# Patient Record
Sex: MALE | Race: AMERICAN INDIAN OR ALASKA NATIVE | HISPANIC OR LATINO | Employment: OTHER | URBAN - METROPOLITAN AREA
[De-identification: names, ages, dates, MRNs, and addresses within clinical notes are randomized per-mention and may not be internally consistent; named-entity substitution may affect disease eponyms.]

---

## 2022-10-11 ENCOUNTER — HOSPITAL ENCOUNTER (EMERGENCY)
Facility: HOSPITAL | Age: 22
Discharge: HOME | End: 2022-10-11
Admitting: EMERGENCY MEDICINE

## 2022-10-11 VITALS
OXYGEN SATURATION: 99 % | TEMPERATURE: 100 F | RESPIRATION RATE: 16 BRPM | HEIGHT: 63 IN | HEART RATE: 100 BPM | SYSTOLIC BLOOD PRESSURE: 122 MMHG | DIASTOLIC BLOOD PRESSURE: 75 MMHG

## 2022-10-11 DIAGNOSIS — S71.112A LACERATION OF SKIN OF LEFT THIGH, INITIAL ENCOUNTER: ICD-10-CM

## 2022-10-11 PROCEDURE — 250N000009 HC RX 250: Performed by: EMERGENCY MEDICINE

## 2022-10-11 PROCEDURE — 250N000011 HC RX IP 250 OP 636: Performed by: EMERGENCY MEDICINE

## 2022-10-11 PROCEDURE — 99283 EMERGENCY DEPT VISIT LOW MDM: CPT | Mod: 25

## 2022-10-11 PROCEDURE — 250N000013 HC RX MED GY IP 250 OP 250 PS 637: Performed by: EMERGENCY MEDICINE

## 2022-10-11 PROCEDURE — 271N000002 HC RX 271: Performed by: EMERGENCY MEDICINE

## 2022-10-11 PROCEDURE — 90715 TDAP VACCINE 7 YRS/> IM: CPT | Performed by: EMERGENCY MEDICINE

## 2022-10-11 PROCEDURE — 90471 IMMUNIZATION ADMIN: CPT | Performed by: EMERGENCY MEDICINE

## 2022-10-11 PROCEDURE — 12002 RPR S/N/AX/GEN/TRNK2.6-7.5CM: CPT

## 2022-10-11 RX ORDER — GINSENG 100 MG
CAPSULE ORAL ONCE
Status: DISCONTINUED | OUTPATIENT
Start: 2022-10-11 | End: 2022-10-11 | Stop reason: HOSPADM

## 2022-10-11 RX ORDER — METHYLCELLULOSE 4000CPS 30 %
POWDER (GRAM) MISCELLANEOUS
Status: COMPLETED | OUTPATIENT
Start: 2022-10-11 | End: 2022-10-11

## 2022-10-11 RX ORDER — IBUPROFEN 600 MG/1
600 TABLET, FILM COATED ORAL ONCE
Status: COMPLETED | OUTPATIENT
Start: 2022-10-11 | End: 2022-10-11

## 2022-10-11 RX ADMIN — EPINEPHRINE BITARTRATE 3 ML: 1 POWDER at 19:14

## 2022-10-11 RX ADMIN — CLOSTRIDIUM TETANI TOXOID ANTIGEN (FORMALDEHYDE INACTIVATED), CORYNEBACTERIUM DIPHTHERIAE TOXOID ANTIGEN (FORMALDEHYDE INACTIVATED), BORDETELLA PERTUSSIS TOXOID ANTIGEN (GLUTARALDEHYDE INACTIVATED), BORDETELLA PERTUSSIS FILAMENTOUS HEMAGGLUTININ ANTIGEN (FORMALDEHYDE INACTIVATED), BORDETELLA PERTUSSIS PERTACTIN ANTIGEN, AND BORDETELLA PERTUSSIS FIMBRIAE 2/3 ANTIGEN 0.5 ML: 5; 2; 2.5; 5; 3; 5 INJECTION, SUSPENSION INTRAMUSCULAR at 19:15

## 2022-10-11 RX ADMIN — IBUPROFEN 600 MG: 600 TABLET, FILM COATED ORAL at 19:11

## 2022-10-11 RX ADMIN — Medication 150 MG: at 19:14

## 2022-10-11 ASSESSMENT — ENCOUNTER SYMPTOMS
NUMBNESS: 0
NAUSEA: 0
WOUND: 1
ABDOMINAL PAIN: 0
CHILLS: 0
SHORTNESS OF BREATH: 0
WEAKNESS: 0
FEVER: 0
ARTHRALGIAS: 0
VOMITING: 0

## 2022-10-11 NOTE — ED NOTES
"ED Provider In Triage Note  [facility]  Encounter Date: Oct 11, 2022    Chief Complaint   Patient presents with     Laceration       Brief HPI:   Emre Peters is a 22 year old male presenting to the Emergency Department with a chief complaint of left thigh laceration. The patient accidentally cut his leg while using a .  The tetanus status is unknown.  Patient has no other complaints other than the pain at the laceration site.     Brief Physical Exam:  /75   Pulse 100   Temp 100  F (37.8  C)   Resp 16   Ht 1.6 m (5' 3\")   SpO2 99%   General: Non-toxic appearing  HEENT: Atraumatic  Resp: No respiratory distress  Abdomen: Non-peritoneal  Neuro: Alert, oriented, answers questions appropriately  Psych: Behavior appropriate  Skin: 3 cm laceration over left anterior thigh.     Plan Initiated in Triage:  Update tetanus.  Laceration with need sutures.       PIT Dispo:   Return to lobby while awaiting workup and ED bed availability    Emilia Vigil DO on 10/11/2022 at 6:06 PM    Patient was evaluated by the Physician in Triage due to a limitation of available rooms in the Emergency Department. A plan of care was discussed based on the information obtained on the initial evaluation and patient was consuled to return back to the Emergency Department lobby after this initial evalutaiton until results were obtained or a room became available in the Emergency Department. Patient was counseled not to leave prior to receiving the results of their workup.     Emilia Vigil DO  [facility] EMERGENCY DEPARTMENT  [address] [place] [id number]  [phone]     Emilia Vigil DO  10/11/22 1808    "

## 2022-10-11 NOTE — ED TRIAGE NOTES
Patient comes in with friend.   Patient speaks [place] and was cutting wood with a  and missed and sliced his left leg, this happened about a 1.5 hours ago. No pain meds on board, would like something.     Unknown tetnus     Patient cleaned in triage      Triage Assessment       Row Name 10/11/22 6601       Triage Assessment (Adult)    Airway WDL WDL

## 2022-10-12 NOTE — ED PROVIDER NOTES
EMERGENCY DEPARTMENT ENCOUNTER      NAME: Emre Gonzalez  AGE: 22 year old male  YOB: 2000  MRN: [id number]  EVALUATION DATE & TIME: No admission date for patient encounter.    PCP: System, Provider Not In    ED PROVIDER: Arely Quezada PA-C      Chief Complaint   Patient presents with     Laceration         FINAL IMPRESSION:  1. Laceration of skin of left thigh, initial encounter          MEDICAL DECISION MAKING:    Pertinent Labs & Imaging studies reviewed. (See chart for details)  22 year old male presents to the Emergency Department for evaluation of laceration to the left thigh.  The patient was using a  prior to arrival when it slipped and cut his left thigh.  The laceration was deep enough to need repair so he presented to the emergency department.  On my evaluation, he was vitally stable and well-appearing.  He did have a 4 cm laceration to the subcutaneous tissue on his left distal thigh and bleeding was controlled.  He had normal strength and sensation distal circulation was intact.  As there is no other trauma to the area and no report of the  breaking or any other foreign material getting in there and I did not feel any foreign bodies, I did not feel imaging was necessary.  Let was applied and I did further anesthetize the wound with 1% lidocaine with epi and at the wound was repaired and cleaned as below.  The patient tolerated the procedure well and I discussed that the stitches need to be removed in the next 10 to 14 days.  I also discussed to keep the wound clean, dry and intact and do not submerge into any dirty water (lakes, bath, pools).  Plan of care was discussed with the patient and his friend and they were in agreement and understanding.  Return precautions were discussed including fever, increased redness, swelling, purulent drainage or any other concerning symptoms.  All questions were answered to the best my ability and the patient was  discharged from the emergency department in stable condition.    ED COURSE:  8:30 PM I met with the patient, obtained history, performed an initial exam, and discussed options and plan for diagnostics and treatment here in the ED.    9:30 PM Patient discharged after being provided with extensive anticipatory guidance and given return precautions, importance of PCP follow-up emphasized.    At the conclusion of the encounter I discussed the results of all of the tests and the disposition. The questions were answered. The patient or family acknowledged understanding and was agreeable with the care plan.     MEDICATIONS GIVEN IN THE EMERGENCY:  Medications   bacitracin ointment (has no administration in time range)   Tdap (tetanus-diphtheria-acell pertussis) (ADACEL) injection 0.5 mL (0.5 mLs Intramuscular Given 10/11/22 1915)   ibuprofen (ADVIL/MOTRIN) tablet 600 mg (600 mg Oral Given 10/11/22 1911)   lidocaine/EPINEPHrine/tetracaine (LET) solution KIT 3 mL (3 mLs Topical Given 10/11/22 1914)   methylcellulose powder (150 mg Topical Given 10/11/22 1914)       NEW PRESCRIPTIONS STARTED AT TODAY'S ER VISIT  There are no discharge medications for this patient.           =================================================================    HPI:    Patient information was obtained from: The patient and his friend    Use of Interpretor: Yes, patient's friend interpreting        Emre Pereyrarra Gonzalez is a 22 year old male who presents to this ED via private vehicle for evaluation of laceration to the left thigh.  Just prior to arrival, the patient was using a  when it slipped and cut his left thigh.  The wound was gaping and needed repair so he presented to the emergency department.  On my evaluation, the patient denies any other injuries and is able to move his knee without difficulties.  He denies any numbness/tingling, weakness, joint pain or any other concerning symptoms.  He has otherwise been feeling well  "at his baseline without any fevers, chills, chest pain, difficulty breathing, abdominal pain, nausea, vomiting.      REVIEW OF SYSTEMS:  Review of Systems   Constitutional: Negative for chills and fever.   Respiratory: Negative for shortness of breath.    Cardiovascular: Negative for chest pain.   Gastrointestinal: Negative for abdominal pain, nausea and vomiting.   Musculoskeletal: Negative for arthralgias.   Skin: Positive for wound (left thigh).   Neurological: Negative for weakness and numbness.   All other systems reviewed and are negative.         PAST MEDICAL HISTORY:  No past medical history on file.    PAST SURGICAL HISTORY:  No past surgical history on file.        CURRENT MEDICATIONS:      Current Facility-Administered Medications:      bacitracin ointment, , Topical, Once, Arely Quezada PA-C  No current outpatient medications on file.      ALLERGIES:  No Known Allergies    FAMILY HISTORY:  No family history on file.    SOCIAL HISTORY:   Social History     Socioeconomic History     Marital status:        VITALS:  Patient Vitals for the past 24 hrs:   BP Temp Pulse Resp SpO2 Height   10/11/22 1758 122/75 100  F (37.8  C) 100 16 99 % 1.6 m (5' 3\")       PHYSICAL EXAM    Constitutional: Well developed, Well nourished, NAD  HENT: Normocephalic, Atraumatic, Bilateral external ears normal, mask in place.   Neck: Normal range of motion, No tenderness, Supple, No stridor.  Eyes: Eyes track normally throughout exam, Conjunctiva normal, No discharge.   Respiratory: Speaks full sentences easily. No cough.  Cardiovascular: Heart rate and blood pressure as above.  Musculoskeletal: Normal strength, sensation and range of motion of the left knee and thigh.  2+ DP pulses. No edema. No cyanosis, No clubbing. Good range of motion in all major joints. No tenderness to palpation or major deformities noted.   Integument: 4 cm laceration to the left distal thigh extending into the subcutaneous tissue, no muscle or " tendon damage appreciated, no erythema or warmth.  Warm, No rash. No petechiae.  Neurologic: Alert & oriented x 3, Normal motor function, Normal sensory function, No focal deficits noted. Normal gait.  Psychiatric: Affect normal, Judgment normal, Mood normal. Cooperative.    PROCEDURES:   PROCEDURE: Laceration Repair   INDICATIONS: Laceration   PROCEDURE PROVIDER: Arely Quezada PA-C   SITE: Left thigh   TYPE/SIZE: subcutaneous, clean and no foreign body visualized  4 cm (total length)   FUNCTIONAL ASSESSMENT: Distal sensation, circulation and motor intact   MEDICATION: 5 mLs of 1% Lidocaine with epinephrine   PREPARATION: scrubbing and irrigation with Normal saline and Hibiclens   DEBRIDEMENT: no debridement and wound explored, no foreign body found   CLOSURE:  Superficial layer closed with 9 stitches of 4-0 Ethilon simple interrupted    Total number of sutures/staples placed: 9       Arely Quezada PA-C  Emergency Medicine  [facility]  10/11/2022      Arely Quezada PA-C  10/11/22 1317

## 2022-10-12 NOTE — DISCHARGE INSTRUCTIONS
You are seen and evaluated here in the emergency department for a laceration on your left thigh.  This laceration did not initiate into the muscle just through the subcutaneous fat.  Your tetanus was updated today and I cleaned and irrigated the wound then sewed it back together.     You should have the sutures removed in 10 to 14 days and these can be removed by your primary care provider, urgent care or anywhere else.    Not submerge the area in dirty water including swimming, baths.  The wound clean, dry and intact.  It was cleaned today and bacitracin was placed and this bandage can stay on for the next 24 hours.    Return to the emergency department for any fevers, increased redness/swelling, purulent drainage or any other concerning symptoms.